# Patient Record
Sex: FEMALE | Race: AMERICAN INDIAN OR ALASKA NATIVE | NOT HISPANIC OR LATINO | Employment: OTHER | URBAN - METROPOLITAN AREA
[De-identification: names, ages, dates, MRNs, and addresses within clinical notes are randomized per-mention and may not be internally consistent; named-entity substitution may affect disease eponyms.]

---

## 2018-04-10 ENCOUNTER — NON-PROVIDER VISIT (OUTPATIENT)
Dept: HEMATOLOGY ONCOLOGY | Facility: MEDICAL CENTER | Age: 53
End: 2018-04-10

## 2018-04-10 ENCOUNTER — OFFICE VISIT (OUTPATIENT)
Dept: HEMATOLOGY ONCOLOGY | Facility: MEDICAL CENTER | Age: 53
End: 2018-04-10
Payer: OTHER GOVERNMENT

## 2018-04-10 VITALS
DIASTOLIC BLOOD PRESSURE: 80 MMHG | RESPIRATION RATE: 16 BRPM | OXYGEN SATURATION: 99 % | WEIGHT: 163.91 LBS | TEMPERATURE: 98.4 F | HEIGHT: 64 IN | BODY MASS INDEX: 27.98 KG/M2 | SYSTOLIC BLOOD PRESSURE: 120 MMHG | HEART RATE: 75 BPM

## 2018-04-10 DIAGNOSIS — Q99.9 GENETIC SYNDROME: ICD-10-CM

## 2018-04-10 PROCEDURE — 99243 OFF/OP CNSLTJ NEW/EST LOW 30: CPT | Performed by: MEDICAL GENETICS

## 2018-04-10 ASSESSMENT — PAIN SCALES - GENERAL: PAINLEVEL: NO PAIN

## 2018-04-10 NOTE — PROGRESS NOTES
"Katy Ruelas is a 53 y.o. year old patient who was referred for  genetic risk assessment     Chief Complaint:   Chief Complaint   Patient presents with   • New Patient     Ref:  Dx: possible HHT       HPI: The patient was evaluated by a local clinician where ther consideration of HHT was entertained. A family history was created and was found provocative and the physical exam of the patient with mucosal varicosities and telangectasias suggested that genetic risk assessment was in order.    Location whole body Severity mild Timing intermittant Modifying Factors none  Quality none     Duration lifelong           Context none           Associated Signs and Symptoms none    Review of Systems (ROS):  Constitutional normal   Eyes normal   ENT normal    Respiratory normal   Cardiovascular normal   Gastrointestinal normal   Genitourinary normal   Musculoskeletal normal   Skin multiple \"spots\" with spider like extensions; whole body  Neurological normal   Endocrine normal   Psychiatric normal   Hematologic/lymphatic normal   Allergic/Immunologic no sensitivities to medications reported  Remaining systems negative? Yes  Total review of symptoms  >10:       History (Past/Family/ROS)  Family History:    3 generation pedigree built  Yes   Waqas empiric risk calculation No   Brennen II empiric risk calculation  No  Social History     Yes  Patient Past History     Yes  History areas assessed    3:     Physical Exam  Constitutional    Encounter Vitals  Standard Vitals  Vitals  Blood Pressure: 120/80  Temperature: 36.9 °C (98.4 °F)  Pulse: 75  Respiration: 16  Pulse Oximetry: 99 %  Height: 162.6 cm (5' 4\")  Weight: 74.3 kg (163 lb 14.6 oz)  Encounter Vitals  Temperature: 36.9 °C (98.4 °F)  Temp Source: Temporal  Blood Pressure: 120/80  BP Location: Left, Upper Arm  Patient BP Position: Sitting  Pulse: 75  Respiration: 16  Pulse Oximetry: 99 %  Weight: 74.3 kg (163 lb 14.6 oz)  Height: 162.6 cm (5' 4\")  BMI " (Calculated): 28.14  Pain Score: No pain  Pulmonary-Specific Vitals     Durable Medical Equipment-Specific Vitals              General appearance: normal    Head Circumference: 22 inches  Eyes    Conjunctiva: clear with punctate lesions   Pupils: normal and reactive4     ENMT    External inspection: normal    Assessment of Hearing: normal    Lips/cheeks/gums: hemmorrhagic/bruising lesions in and around the oral cavity and on inside lower lip  Neck   External exam: normal    Thyroid: no masses   Respiratory   Auscultation: clear in all fields      Cardiovascular   Auscultation: RRR with no murmers   Edema : none  Chest   Breasts (exam): not done   Palpation:   GI   External exam and palpation: normal      Pelvic (female): not done   External exam (male):   Lymphatic   Palpation in 2 areas: normal     Musculoskeletal   Gait: normal    Digits and Nails: no lesions seen  Skin   Inspection: punctate hemmorrhagic lesions c/w telangectasias   Palpation: normal   Neurologic   Cranial nerves: intact   DTR’s: 1+ and equal  PE summary    18 bullets (of 25 total):     Problem Points   New, further evaluation planned (4)   Low  risk    DATA POINTS   >4:    PROBLEM POINTS  4:     RISK  Intermediate    TYPE OF MEDICAL DECISION MAKING Intermediate      40 minutes TIME (FACE TO FACE) AND DOCUMENTED  DOCUMENTATION DESCRIBE CONTENTS OF COUNSELING/CARE COORDINATION  DOCUMENTATION SUPPORT >50% TIME SPENT IN COUNSELING/COORDINATION        32474 (40 minutes)  with   Moderate complexity    Patient referred for evaluation with suspected HHT. The patient has many features of HHT and her family history is VERY suggestive of same    INVITAE 59296 HHT panel sent

## 2018-04-10 NOTE — PROGRESS NOTES
Katy Ruelas is a 53 y.o. female here for a non-provider visit for: Lab Draws  on 4/10/2018 at 11:53 AM    Procedure Performed: Venipuncture     Anatomical site: Left Antecubital Area (AC)    Equipment used: 21g    Labs drawn: [employer] [facility]     Ordering Provider: Dr. Barry Joya    Immanuel By: MANDIE CLARKE  No complications.  was present in the office the entire time that I was doing the patients’ blood draw.

## 2018-04-16 ENCOUNTER — TELEPHONE (OUTPATIENT)
Dept: HEMATOLOGY ONCOLOGY | Facility: MEDICAL CENTER | Age: 53
End: 2018-04-16

## 2018-04-16 NOTE — TELEPHONE ENCOUNTER
Spoke to Katy concerning her referral.  Per her she had gone in to see the provider at [facility] Monday April 9th, at that time [facility] was to have sent the referral.  She was going to follow up with their office.  Verified she had the correct information and fax # for the referral.

## 2018-04-19 ENCOUNTER — TELEPHONE (OUTPATIENT)
Dept: HEMATOLOGY ONCOLOGY | Facility: MEDICAL CENTER | Age: 53
End: 2018-04-19

## 2018-04-19 NOTE — TELEPHONE ENCOUNTER
Patient returned Dr. Joya's call in regards to her test results. Patient can be reached at [phone].

## 2018-04-24 NOTE — TELEPHONE ENCOUNTER
"TC to patient   Discussed \"negative\" results    Patient aware of no pathologic variants.    Toan  "

## 2018-04-25 ENCOUNTER — OFFICE VISIT (OUTPATIENT)
Dept: NEUROLOGY | Facility: MEDICAL CENTER | Age: 53
End: 2018-04-25
Payer: OTHER GOVERNMENT

## 2018-04-25 VITALS
DIASTOLIC BLOOD PRESSURE: 76 MMHG | SYSTOLIC BLOOD PRESSURE: 120 MMHG | HEIGHT: 64 IN | TEMPERATURE: 99.3 F | HEART RATE: 91 BPM | WEIGHT: 163.47 LBS | OXYGEN SATURATION: 96 % | BODY MASS INDEX: 27.91 KG/M2 | RESPIRATION RATE: 16 BRPM

## 2018-04-25 DIAGNOSIS — G56.03 BILATERAL CARPAL TUNNEL SYNDROME: Primary | ICD-10-CM

## 2018-04-25 PROCEDURE — 99205 OFFICE O/P NEW HI 60 MIN: CPT | Performed by: PSYCHIATRY & NEUROLOGY

## 2018-04-25 RX ORDER — PREDNISONE 10 MG/1
TABLET ORAL
Qty: 45 TAB | Refills: 0 | Status: SHIPPED | OUTPATIENT
Start: 2018-04-25 | End: 2019-08-06

## 2018-04-25 ASSESSMENT — PATIENT HEALTH QUESTIONNAIRE - PHQ9: CLINICAL INTERPRETATION OF PHQ2 SCORE: 0

## 2018-04-25 ASSESSMENT — ENCOUNTER SYMPTOMS
FALLS: 0
ABDOMINAL PAIN: 0
MEMORY LOSS: 0
SENSORY CHANGE: 0
TINGLING: 0
FOCAL WEAKNESS: 1

## 2018-04-25 NOTE — PROGRESS NOTES
Subjective:      Katy Ruelas is a 53 y.o. female who presents for consultation from the office of BAKARI Tabor, with a history of bilateral hand and upper extremity pain suggestive of possible CTS.    KAMRON Burns is a pleasant 53-year-old right-handed  female whose symptoms started over the last year and which have slowly and steadily progressed. She was initially aware of some pain and aching sensation in the palms, it seemed to start on the right side, her dominant side, but eventually it was bilateral. She now describes a constant aching in the palm which can then be associated with a shooting pain into the palm which then radiates up the volar surface under the wrist and to the medial forearm. She denies any paresthesias or dysesthesias. Simple use of the hands will elicit the shooting pains, the biggest issue has to do with fine use of the hands and fingers, twisting actions, etc. 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 She denies loss of sensation in the hands.    Now she even has symptoms that can awaken her from sleep, mostly the stabbing and shooting pains. Static position of the upper extremities does not elicit symptoms.    She denies any gross motor impairment such as holding heavy objects or lifting her arms above her head. She has some chronic neck pain, she denies tingling between the shoulder blades when she flexes her neck forwards, but none of this elicits pain radiating into the upper extremities or down the spinal axis, respectively. The symptoms do not change on Valsalva, she denies sensory symptoms in the lower extremities, constrictive feelings across the chest, change in bowel or bladder function.    She saw Mr. Zuniga initially, she was placed on some medicine that she takes at night, this helps the symptoms in the morning, but things simply worsen as the day goes on. She does not  "remember the name of the drug, but she remembers being told not to take Motrin at the same time.    She has a history of celiac disease without neurologic complication, is not on immunosuppressive treatments, she also denies a history of thyroid disease, diabetes, malignancy, MS, seizure, migraine, hypertension, CAD, CVA, PVD, pulmonary disease, or blood dyscrasia. There is no surgical history of note. No one in the family has a history of similar symptoms. Her mother is 85 and alive and well, her father  from complications of pneumonia, one brother has diabetes, both her children are alive and well. She does not smoke, she will occasionally have a glass of wine on a social occasion, she works in real estate. She is presently on no medications other than the single medicine she takes by  Efrain'prescription.    Review of Systems   Constitutional: Negative for malaise/fatigue.   Gastrointestinal: Negative for abdominal pain.   Musculoskeletal: Negative for falls and joint pain.   Neurological: Positive for focal weakness. Negative for tingling and sensory change.   Psychiatric/Behavioral: Negative for memory loss.   All other systems reviewed and are negative.       Objective:     /76   Pulse 91   Temp 37.4 °C (99.3 °F)   Resp 16   Ht 1.626 m (5' 4\")   Wt 74.2 kg (163 lb 7.5 oz)   SpO2 96%   BMI 28.06 kg/m²      Physical Exam    She appears in no acute distress. Her vital signs are stable. There is no malar rash, jaw or temporal tenderness, or jaw claudication. The neck is supple, range of motion is full, forced flexion of the neck does elicit some tingling between the scapula only, compression maneuvers of the cervical spine are negative. Carotid pulses are present bilaterally without asymmetry. Cardiac evaluation reveals a regular rhythm. There is some tenderness at the wrists bilaterally to direct compression, but Tinel and Phalen signs are negative for paresthesias, there is increasing pain in " the palm. There is no tenderness of the medial elbow bilaterally. Distal pulses are intact. There is no swelling of the wrists.    She is fully oriented, there is no aphasia or inattention. PERRLA/EOMI, visual fields are full, facial movements are symmetric, there is no sensory loss on either side of the face temperature light touch, the tongue and uvular midline, shoulder shrug and head rotation are intact.    Musculoskeletal exam reveals normal tone bilaterally, there is no evidence of atrophy, tremor, asterixis or drift. Strength is intact though there is some mild antalgic weakness with grasp bilaterally. There is no intrinsic muscular weakness in either of the hands. Reflexes are brisk and present at all points including the lower extremities, none dropped, there are no asymmetries, both toes are downgoing.    Repetitive movements with the hands, fingers and feet are intact and symmetric with normal amplitude and frequencies. There is no appendicular dystaxia. She stands easily, arm swing is symmetric, gait is of normal station.    Sensory exam is exquisitely intact to temperature, pinprick, light touch, vibration and JPS in all 4 extremities.     Assessment/Plan:     1. Bilateral carpal tunnel syndrome  I suspect that this is the diagnosis, I agree with Mr. Zuniga in this assessment. Bilaterality is a little unusual, though she has wrists given the job requirements that she has. There was nothing to suggest a spinal cord localization at this time, she is not myelopathic, there are no radicular signs or components to her pain. Imaging of the neck vessels is not indicated. Fortunately, her examination is normal, always a good sign, though she still has significant pain and ache with simple compression as well as by history, a constant problem. Simple physical activity seems to be making things worse. She will go home and find out the name of the medication she is presently taking, I would recommend using oral  steroids first in the hopes of getting better control overall, then using this other medicine or another anti-inflammatory if appropriate, to maintain control. Hand therapies were also indicated. Depending on benefit and tolerability as well as symptom stabilization, we may need to go to the next group which would be steroid injection at the wrist, EMG/NCV studies, etc. Depending on these results, referral to a surgical hand specialist might be necessary.    Face-to-face time was spent reviewing all the above. She was told that she can do very well, but therapies and anti-inflammatories are certainly critical portion of the treatment regimen regardless of whether or not surgery is indicated. Phone contact in the interim as we adjust medications and treatment recommendations, follow-up otherwise in several months.    Prednisone 60 mg daily will be started, reduce by 10 mg every other day until off. She was told to stop the medicine she is presently on, we will then decide what follow-up after the steroids are completed. Referral to hand specialist for custom splinting, improve range of motion, pain reduction and reinjury prevention is also important. Further recommendations will be made depending on response and tolerability.    - predniSONE (DELTASONE) 10 MG Tab; 6 tab daily for 2 days, then reduce by 1 tab every other day until off  Dispense: 45 Tab; Refill: 0  - REFERRAL TO PHYSICAL THERAPY Reason for Therapy: Eval/Treat/Report    Time: Evaluation of 60 minutes for exam, review, discussion, and education  Discussion: As mentioned in the assessment, over 50% of the time spent face-to-face counseling and coordination of care

## 2018-04-27 ENCOUNTER — TELEPHONE (OUTPATIENT)
Dept: HEMATOLOGY ONCOLOGY | Facility: MEDICAL CENTER | Age: 53
End: 2018-04-27

## 2018-04-27 NOTE — TELEPHONE ENCOUNTER
Dr Barcenas (Dermatologist)  Medical Assistant called stated that Dr Barcenas ,would like for Dr Joya to call back next week regarding the patient. Thank you

## 2018-05-01 ENCOUNTER — TELEPHONE (OUTPATIENT)
Dept: NEUROLOGY | Facility: MEDICAL CENTER | Age: 53
End: 2018-05-01

## 2018-05-01 NOTE — TELEPHONE ENCOUNTER
Patient called for Dr. Patino in regards to her Meloxicam. She states she is taking 7.5 mg PRN for pain. She stated Dr. Patino might want to adjust her dose. Please advise.

## 2019-03-05 ENCOUNTER — TELEPHONE (OUTPATIENT)
Dept: RADIOLOGY | Facility: MEDICAL CENTER | Age: 54
End: 2019-03-05

## 2019-03-06 ENCOUNTER — HOSPITAL ENCOUNTER (OUTPATIENT)
Dept: RADIOLOGY | Facility: MEDICAL CENTER | Age: 54
End: 2019-03-06
Attending: PHYSICIAN ASSISTANT
Payer: OTHER GOVERNMENT

## 2019-03-06 DIAGNOSIS — Z12.31 VISIT FOR SCREENING MAMMOGRAM: ICD-10-CM

## 2019-03-06 PROCEDURE — 77063 BREAST TOMOSYNTHESIS BI: CPT

## 2019-06-14 ENCOUNTER — HOSPITAL ENCOUNTER (OUTPATIENT)
Dept: RADIOLOGY | Facility: MEDICAL CENTER | Age: 54
End: 2019-06-14
Attending: PHYSICIAN ASSISTANT
Payer: OTHER GOVERNMENT

## 2019-06-14 DIAGNOSIS — R10.11 ABDOMINAL PAIN, RIGHT UPPER QUADRANT: ICD-10-CM

## 2019-06-14 PROCEDURE — 76705 ECHO EXAM OF ABDOMEN: CPT

## 2019-08-06 DIAGNOSIS — Z01.812 PRE-OPERATIVE LABORATORY EXAMINATION: ICD-10-CM

## 2019-08-06 LAB
ALBUMIN SERPL BCP-MCNC: 4 G/DL (ref 3.2–4.9)
ALBUMIN SERPL BCP-MCNC: 4 G/DL (ref 3.2–4.9)
ALBUMIN/GLOB SERPL: 1.6 G/DL
ALP SERPL-CCNC: 51 U/L (ref 30–99)
ALP SERPL-CCNC: 51 U/L (ref 30–99)
ALT SERPL-CCNC: 13 U/L (ref 2–50)
ALT SERPL-CCNC: 13 U/L (ref 2–50)
ANION GAP SERPL CALC-SCNC: 7 MMOL/L (ref 0–11.9)
AST SERPL-CCNC: 14 U/L (ref 12–45)
AST SERPL-CCNC: 15 U/L (ref 12–45)
BILIRUB CONJ SERPL-MCNC: 0.1 MG/DL (ref 0.1–0.5)
BILIRUB INDIRECT SERPL-MCNC: 0.7 MG/DL (ref 0–1)
BILIRUB SERPL-MCNC: 0.8 MG/DL (ref 0.1–1.5)
BILIRUB SERPL-MCNC: 0.8 MG/DL (ref 0.1–1.5)
BUN SERPL-MCNC: 13 MG/DL (ref 8–22)
CALCIUM SERPL-MCNC: 8.8 MG/DL (ref 8.5–10.5)
CHLORIDE SERPL-SCNC: 108 MMOL/L (ref 96–112)
CO2 SERPL-SCNC: 25 MMOL/L (ref 20–33)
CREAT SERPL-MCNC: 0.78 MG/DL (ref 0.5–1.4)
GLOBULIN SER CALC-MCNC: 2.5 G/DL (ref 1.9–3.5)
GLUCOSE SERPL-MCNC: 94 MG/DL (ref 65–99)
POTASSIUM SERPL-SCNC: 3.8 MMOL/L (ref 3.6–5.5)
PROT SERPL-MCNC: 6.5 G/DL (ref 6–8.2)
PROT SERPL-MCNC: 6.5 G/DL (ref 6–8.2)
SODIUM SERPL-SCNC: 140 MMOL/L (ref 135–145)

## 2019-08-06 PROCEDURE — 80076 HEPATIC FUNCTION PANEL: CPT

## 2019-08-06 PROCEDURE — 80053 COMPREHEN METABOLIC PANEL: CPT

## 2019-08-06 PROCEDURE — 36415 COLL VENOUS BLD VENIPUNCTURE: CPT

## 2019-08-06 RX ORDER — ACETAMINOPHEN 500 MG
500-1000 TABLET ORAL EVERY 6 HOURS PRN
COMMUNITY

## 2019-08-06 SDOH — HEALTH STABILITY: MENTAL HEALTH: HOW OFTEN DO YOU HAVE A DRINK CONTAINING ALCOHOL?: MONTHLY OR LESS

## 2019-08-09 ENCOUNTER — ANESTHESIA EVENT (OUTPATIENT)
Dept: SURGERY | Facility: MEDICAL CENTER | Age: 54
End: 2019-08-09
Payer: OTHER GOVERNMENT

## 2019-08-09 ENCOUNTER — ANESTHESIA (OUTPATIENT)
Dept: SURGERY | Facility: MEDICAL CENTER | Age: 54
End: 2019-08-09
Payer: OTHER GOVERNMENT

## 2019-08-09 ENCOUNTER — HOSPITAL ENCOUNTER (OUTPATIENT)
Facility: MEDICAL CENTER | Age: 54
End: 2019-08-09
Attending: SURGERY | Admitting: SURGERY
Payer: OTHER GOVERNMENT

## 2019-08-09 VITALS
WEIGHT: 154.32 LBS | HEIGHT: 64 IN | BODY MASS INDEX: 26.35 KG/M2 | HEART RATE: 70 BPM | DIASTOLIC BLOOD PRESSURE: 65 MMHG | OXYGEN SATURATION: 97 % | SYSTOLIC BLOOD PRESSURE: 118 MMHG | RESPIRATION RATE: 16 BRPM | TEMPERATURE: 98.2 F

## 2019-08-09 DIAGNOSIS — G89.18 POSTOPERATIVE PAIN: ICD-10-CM

## 2019-08-09 DIAGNOSIS — K81.1 CHRONIC CHOLECYSTITIS: ICD-10-CM

## 2019-08-09 LAB — PATHOLOGY CONSULT NOTE: NORMAL

## 2019-08-09 PROCEDURE — 160009 HCHG ANES TIME/MIN: Performed by: SURGERY

## 2019-08-09 PROCEDURE — A6402 STERILE GAUZE <= 16 SQ IN: HCPCS | Performed by: SURGERY

## 2019-08-09 PROCEDURE — 160046 HCHG PACU - 1ST 60 MINS PHASE II: Performed by: SURGERY

## 2019-08-09 PROCEDURE — 88304 TISSUE EXAM BY PATHOLOGIST: CPT

## 2019-08-09 PROCEDURE — 160035 HCHG PACU - 1ST 60 MINS PHASE I: Performed by: SURGERY

## 2019-08-09 PROCEDURE — 700111 HCHG RX REV CODE 636 W/ 250 OVERRIDE (IP): Performed by: ANESTHESIOLOGY

## 2019-08-09 PROCEDURE — 500868 HCHG NEEDLE, SURGI(VARES): Performed by: SURGERY

## 2019-08-09 PROCEDURE — 700102 HCHG RX REV CODE 250 W/ 637 OVERRIDE(OP): Performed by: ANESTHESIOLOGY

## 2019-08-09 PROCEDURE — 160025 RECOVERY II MINUTES (STATS): Performed by: SURGERY

## 2019-08-09 PROCEDURE — 160048 HCHG OR STATISTICAL LEVEL 1-5: Performed by: SURGERY

## 2019-08-09 PROCEDURE — 502571 HCHG PACK, LAP CHOLE: Performed by: SURGERY

## 2019-08-09 PROCEDURE — 160028 HCHG SURGERY MINUTES - 1ST 30 MINS LEVEL 3: Performed by: SURGERY

## 2019-08-09 PROCEDURE — 160002 HCHG RECOVERY MINUTES (STAT): Performed by: SURGERY

## 2019-08-09 PROCEDURE — A9270 NON-COVERED ITEM OR SERVICE: HCPCS | Performed by: ANESTHESIOLOGY

## 2019-08-09 PROCEDURE — 160036 HCHG PACU - EA ADDL 30 MINS PHASE I: Performed by: SURGERY

## 2019-08-09 PROCEDURE — 700101 HCHG RX REV CODE 250: Performed by: SURGERY

## 2019-08-09 PROCEDURE — 501582 HCHG TROCAR, THRD BLADED: Performed by: SURGERY

## 2019-08-09 PROCEDURE — 700105 HCHG RX REV CODE 258: Performed by: SURGERY

## 2019-08-09 PROCEDURE — 700101 HCHG RX REV CODE 250: Performed by: ANESTHESIOLOGY

## 2019-08-09 PROCEDURE — 501584 HCHG TROCAR, THRD CAN&SEAL11X100: Performed by: SURGERY

## 2019-08-09 PROCEDURE — 160039 HCHG SURGERY MINUTES - EA ADDL 1 MIN LEVEL 3: Performed by: SURGERY

## 2019-08-09 PROCEDURE — 501838 HCHG SUTURE GENERAL: Performed by: SURGERY

## 2019-08-09 RX ORDER — LIDOCAINE HYDROCHLORIDE 40 MG/ML
SOLUTION TOPICAL PRN
Status: DISCONTINUED | OUTPATIENT
Start: 2019-08-09 | End: 2019-08-09 | Stop reason: HOSPADM

## 2019-08-09 RX ORDER — SODIUM CHLORIDE, SODIUM LACTATE, POTASSIUM CHLORIDE, CALCIUM CHLORIDE 600; 310; 30; 20 MG/100ML; MG/100ML; MG/100ML; MG/100ML
INJECTION, SOLUTION INTRAVENOUS CONTINUOUS
Status: DISCONTINUED | OUTPATIENT
Start: 2019-08-09 | End: 2019-08-09 | Stop reason: HOSPADM

## 2019-08-09 RX ORDER — ONDANSETRON 2 MG/ML
INJECTION INTRAMUSCULAR; INTRAVENOUS PRN
Status: DISCONTINUED | OUTPATIENT
Start: 2019-08-09 | End: 2019-08-09 | Stop reason: HOSPADM

## 2019-08-09 RX ORDER — CEFOTETAN DISODIUM 2 G/20ML
INJECTION, POWDER, FOR SOLUTION INTRAMUSCULAR; INTRAVENOUS
Status: DISCONTINUED
Start: 2019-08-09 | End: 2019-08-09 | Stop reason: HOSPADM

## 2019-08-09 RX ORDER — HYDROMORPHONE HYDROCHLORIDE 1 MG/ML
0.1 INJECTION, SOLUTION INTRAMUSCULAR; INTRAVENOUS; SUBCUTANEOUS
Status: DISCONTINUED | OUTPATIENT
Start: 2019-08-09 | End: 2019-08-09 | Stop reason: HOSPADM

## 2019-08-09 RX ORDER — OXYCODONE HCL 5 MG/5 ML
5 SOLUTION, ORAL ORAL
Status: COMPLETED | OUTPATIENT
Start: 2019-08-09 | End: 2019-08-09

## 2019-08-09 RX ORDER — HYDROMORPHONE HYDROCHLORIDE 1 MG/ML
0.2 INJECTION, SOLUTION INTRAMUSCULAR; INTRAVENOUS; SUBCUTANEOUS
Status: DISCONTINUED | OUTPATIENT
Start: 2019-08-09 | End: 2019-08-09 | Stop reason: HOSPADM

## 2019-08-09 RX ORDER — ONDANSETRON 4 MG/1
4 TABLET, FILM COATED ORAL EVERY 6 HOURS PRN
Qty: 10 TAB | Refills: 0 | Status: SHIPPED | OUTPATIENT
Start: 2019-08-09 | End: 2019-08-16

## 2019-08-09 RX ORDER — BUPIVACAINE HYDROCHLORIDE AND EPINEPHRINE 5; 5 MG/ML; UG/ML
INJECTION, SOLUTION EPIDURAL; INTRACAUDAL; PERINEURAL
Status: DISCONTINUED
Start: 2019-08-09 | End: 2019-08-09 | Stop reason: HOSPADM

## 2019-08-09 RX ORDER — HYDROMORPHONE HYDROCHLORIDE 1 MG/ML
0.4 INJECTION, SOLUTION INTRAMUSCULAR; INTRAVENOUS; SUBCUTANEOUS
Status: DISCONTINUED | OUTPATIENT
Start: 2019-08-09 | End: 2019-08-09 | Stop reason: HOSPADM

## 2019-08-09 RX ORDER — BUPIVACAINE HYDROCHLORIDE AND EPINEPHRINE 5; 5 MG/ML; UG/ML
INJECTION, SOLUTION EPIDURAL; INTRACAUDAL; PERINEURAL
Status: DISCONTINUED | OUTPATIENT
Start: 2019-08-09 | End: 2019-08-09 | Stop reason: HOSPADM

## 2019-08-09 RX ORDER — DIPHENHYDRAMINE HYDROCHLORIDE 50 MG/ML
12.5 INJECTION INTRAMUSCULAR; INTRAVENOUS
Status: DISCONTINUED | OUTPATIENT
Start: 2019-08-09 | End: 2019-08-09 | Stop reason: HOSPADM

## 2019-08-09 RX ORDER — OXYCODONE AND ACETAMINOPHEN 7.5; 325 MG/1; MG/1
1 TABLET ORAL EVERY 6 HOURS PRN
Qty: 16 TAB | Refills: 0 | Status: SHIPPED | OUTPATIENT
Start: 2019-08-09 | End: 2019-08-16

## 2019-08-09 RX ORDER — METOCLOPRAMIDE HYDROCHLORIDE 5 MG/ML
INJECTION INTRAMUSCULAR; INTRAVENOUS PRN
Status: DISCONTINUED | OUTPATIENT
Start: 2019-08-09 | End: 2019-08-09 | Stop reason: SURG

## 2019-08-09 RX ORDER — HALOPERIDOL 5 MG/ML
1 INJECTION INTRAMUSCULAR
Status: DISCONTINUED | OUTPATIENT
Start: 2019-08-09 | End: 2019-08-09 | Stop reason: HOSPADM

## 2019-08-09 RX ORDER — CEFOTETAN DISODIUM 2 G/20ML
INJECTION, POWDER, FOR SOLUTION INTRAMUSCULAR; INTRAVENOUS PRN
Status: DISCONTINUED | OUTPATIENT
Start: 2019-08-09 | End: 2019-08-09 | Stop reason: SURG

## 2019-08-09 RX ORDER — OXYCODONE HCL 5 MG/5 ML
10 SOLUTION, ORAL ORAL
Status: COMPLETED | OUTPATIENT
Start: 2019-08-09 | End: 2019-08-09

## 2019-08-09 RX ORDER — MEPERIDINE HYDROCHLORIDE 25 MG/ML
12.5 INJECTION INTRAMUSCULAR; INTRAVENOUS; SUBCUTANEOUS
Status: DISCONTINUED | OUTPATIENT
Start: 2019-08-09 | End: 2019-08-09 | Stop reason: HOSPADM

## 2019-08-09 RX ORDER — ONDANSETRON 2 MG/ML
4 INJECTION INTRAMUSCULAR; INTRAVENOUS
Status: DISCONTINUED | OUTPATIENT
Start: 2019-08-09 | End: 2019-08-09 | Stop reason: HOSPADM

## 2019-08-09 RX ADMIN — SODIUM CHLORIDE, POTASSIUM CHLORIDE, SODIUM LACTATE AND CALCIUM CHLORIDE: 600; 310; 30; 20 INJECTION, SOLUTION INTRAVENOUS at 12:21

## 2019-08-09 RX ADMIN — FENTANYL CITRATE 50 MCG: 50 INJECTION, SOLUTION INTRAMUSCULAR; INTRAVENOUS at 12:29

## 2019-08-09 RX ADMIN — ONDANSETRON 4 MG: 2 INJECTION INTRAMUSCULAR; INTRAVENOUS at 13:04

## 2019-08-09 RX ADMIN — FENTANYL CITRATE 50 MCG: 50 INJECTION, SOLUTION INTRAMUSCULAR; INTRAVENOUS at 13:34

## 2019-08-09 RX ADMIN — LIDOCAINE HYDROCHLORIDE 160 MG: 40 SOLUTION TOPICAL at 12:31

## 2019-08-09 RX ADMIN — CEFOTETAN DISODIUM 2 G: 2 INJECTION, POWDER, FOR SOLUTION INTRAMUSCULAR; INTRAVENOUS at 12:21

## 2019-08-09 RX ADMIN — SODIUM CHLORIDE, POTASSIUM CHLORIDE, SODIUM LACTATE AND CALCIUM CHLORIDE: 600; 310; 30; 20 INJECTION, SOLUTION INTRAVENOUS at 11:30

## 2019-08-09 RX ADMIN — MIDAZOLAM HYDROCHLORIDE 2 MG: 1 INJECTION, SOLUTION INTRAMUSCULAR; INTRAVENOUS at 12:24

## 2019-08-09 RX ADMIN — ROCURONIUM BROMIDE 25 MG: 10 INJECTION, SOLUTION INTRAVENOUS at 12:31

## 2019-08-09 RX ADMIN — FENTANYL CITRATE 50 MCG: 50 INJECTION, SOLUTION INTRAMUSCULAR; INTRAVENOUS at 12:22

## 2019-08-09 RX ADMIN — FENTANYL CITRATE 50 MCG: 50 INJECTION, SOLUTION INTRAMUSCULAR; INTRAVENOUS at 13:56

## 2019-08-09 RX ADMIN — PROPOFOL 150 MG: 10 INJECTION, EMULSION INTRAVENOUS at 12:31

## 2019-08-09 RX ADMIN — FENTANYL CITRATE 25 MCG: 50 INJECTION, SOLUTION INTRAMUSCULAR; INTRAVENOUS at 12:35

## 2019-08-09 RX ADMIN — OXYCODONE HYDROCHLORIDE 10 MG: 5 SOLUTION ORAL at 13:36

## 2019-08-09 RX ADMIN — METOCLOPRAMIDE 10 MG: 5 INJECTION, SOLUTION INTRAMUSCULAR; INTRAVENOUS at 12:30

## 2019-08-09 ASSESSMENT — PAIN SCALES - GENERAL: PAIN_LEVEL: 3

## 2019-08-09 NOTE — ANESTHESIA PROCEDURE NOTES
Airway  Date/Time: 8/9/2019 12:31 PM  Performed by: Phillip Rainey M.D.  Authorized by: Phillip Rainey M.D.     Location:  OR  Urgency:  Elective  Indications for Airway Management:  Anesthesia  Spontaneous Ventilation: absent    Sedation Level:  Deep  Preoxygenated: Yes    Patient Position:  Sniffing  Final Airway Type:  Endotracheal airway  Final Endotracheal Airway:  ETT  Cuffed: Yes    Technique Used for Successful ETT Placement:  Direct laryngoscopy  Insertion Site:  Oral  Blade Type:  Fly  Laryngoscope Blade/Videolaryngoscope Blade Size:  3  ETT Size (mm):  7.0  Measured from:  Teeth  ETT to Teeth (cm):  21  Placement Verified by: auscultation and capnometry    Cormack-Lehane Classification:  Grade I - full view of glottis  Number of Attempts at Approach:  1

## 2019-08-09 NOTE — OR NURSING
1521 Report from Francesca KHALIL.    1534 Pt up to side of bed to change clothes, tolerated well. Moved to recliner.    1545 Pt placed on ear probe to watch O2.  Ice pack provided.    1550 Discharge instructions reviewed with patient and .   1600 Pt meets d/c criteria, feels ready to go home.  PIV removed, pt tolerated well.   1609 Pt escorted out via wheelchair to d/c home with .

## 2019-08-09 NOTE — OR NURSING
1335- assumed care of pt she is c/o high level pain medicated per orders with both iv and po pain meds see mar  1400- remedicated iv fent cold pack to surg sites gauze and tegaderm cdi without bleeding   Tolerating po fluids   1521 doesn't feel need to void yet pain tolerable level without nausea surg inc cdi spouse at bedside report to Dyan wilkerson for break.

## 2019-08-09 NOTE — OR SURGEON
Immediate Post OP Note    PreOp Diagnosis: Biliary Colic    PostOp Diagnosis: Same    Procedure(s):  CHOLECYSTECTOMY, LAPAROSCOPIC - Wound Class: Clean Contaminated    Surgeon(s):  Tj Richardson M.D.    Anesthesiologist/Type of Anesthesia:GET  Anesthesiologist: Phillip Rainey M.D./General    Surgical Staff:  Assistant: BRENT Arredondo  Circulator: Niraj Gilliland R.N.  Scrub Person: Bill Jones; Rajesh Flores; Hal Isbell    Specimens removed if any:  ID Type Source Tests Collected by Time Destination   A : GALLBLADDER Tissue Gallbladder PATHOLOGY SPECIMEN Tj Richardson M.D. 8/9/2019 12:44 PM        Estimated Blood Loss: <5 cc    Findings: no acute inflammation, adhesion to gallbladder from omentum    Complications: none        8/9/2019 1:10 PM Tj Richardson M.D.

## 2019-08-09 NOTE — DISCHARGE INSTRUCTIONS
ACTIVITY: Rest and take it easy for the first 24 hours.  A responsible adult is recommended to remain with you during that time.  It is normal to feel sleepy.  We encourage you to not do anything that requires balance, judgment or coordination.    MILD FLU-LIKE SYMPTOMS ARE NORMAL. YOU MAY EXPERIENCE GENERALIZED MUSCLE ACHES, THROAT IRRITATION, HEADACHE AND/OR SOME NAUSEA.    FOR 24 HOURS DO NOT:  Drive, operate machinery or run household appliances.  Drink beer or alcoholic beverages.   Make important decisions or sign legal documents.    SPECIAL INSTRUCTIONS: *Follow up: 10-14 days  Activity:no lifting weight greater than 20 lbs x 4 weeks  Diet:clear liquids tonight and advance as tolerated  Showers only x 2 weeks  No driving for one week or while on pain medications  Wound Care:remove tegaderms in 4 days**    DIET: To avoid nausea, slowly advance diet as tolerated, avoiding spicy or greasy foods for the first day.  Add more substantial food to your diet according to your physician's instructions.  Babies can be fed formula or breast milk as soon as they are hungry.  INCREASE FLUIDS AND FIBER TO AVOID CONSTIPATION.    SURGICAL DRESSING/BATHING: *see above**    FOLLOW-UP APPOINTMENT:  A follow-up appointment should be arranged with your doctor in; call to schedule Or follow up as already scheduled     You should CALL YOUR PHYSICIAN if you develop:  Fever greater than 101 degrees F.  Pain not relieved by medication, or persistent nausea or vomiting.  Excessive bleeding (blood soaking through dressing) or unexpected drainage from the wound.  Extreme redness or swelling around the incision site, drainage of pus or foul smelling drainage.  Inability to urinate or empty your bladder within 8 hours.  Problems with breathing or chest pain.    You should call 911 if you develop problems with breathing or chest pain.  If you are unable to contact your doctor or surgical center, you should go to the nearest emergency room  or urgent care center.  Physician's telephone #: *[phone]**    If any questions arise, call your doctor.  If your doctor is not available, please feel free to call the Surgical Center at [phone].  The Center is open Monday through Friday from 7AM to 7PM.  You can also call the HEALTH HOTLINE open 24 hours/day, 7 days/week and speak to a nurse at [phone], or toll free at [phone].    A registered nurse may call you a few days after your surgery to see how you are doing after your procedure.    MEDICATIONS: Resume taking daily medication.  Take prescribed pain medication with food.  If no medication is prescribed, you may take non-aspirin pain medication if needed.  PAIN MEDICATION CAN BE VERY CONSTIPATING.  Take a stool softener or laxative such as senokot, pericolace, or milk of magnesia if needed.    Prescription given for *percocet**.  Last pain medication given at *1:30pm **.    If your physician has prescribed pain medication that includes Acetaminophen (Tylenol), do not take additional Acetaminophen (Tylenol) while taking the prescribed medication.    Depression / Suicide Risk    As you are discharged from this [facility] facility, it is important to learn how to keep safe from harming yourself.    Recognize the warning signs:  · Abrupt changes in personality, positive or negative- including increase in energy   · Giving away possessions  · Change in eating patterns- significant weight changes-  positive or negative  · Change in sleeping patterns- unable to sleep or sleeping all the time   · Unwillingness or inability to communicate  · Depression  · Unusual sadness, discouragement and loneliness  · Talk of wanting to die  · Neglect of personal appearance   · Rebelliousness- reckless behavior  · Withdrawal from people/activities they love  · Confusion- inability to concentrate     If you or a loved one observes any of these behaviors or has concerns about self-harm, here's what you can  do:  · Talk about it- your feelings and reasons for harming yourself  · Remove any means that you might use to hurt yourself (examples: pills, rope, extension cords, firearm)  · Get professional help from the community (Mental Health, Substance Abuse, psychological counseling)  · Do not be alone:Call your Safe Contact- someone whom you trust who will be there for you.  · Call your local CRISIS HOTLINE 872-8530 or 063-607-1519  · Call your local Children's Mobile Crisis Response Team [place] (408) 255-1342 or www.Yieldr  · Call the toll free National Suicide Prevention Hotlines   · National Suicide Prevention Lifeline 632-307-VSCJ (8932)  · National Hope Line Network 800-SUICIDE (787-3801)

## 2019-08-09 NOTE — ANESTHESIA POSTPROCEDURE EVALUATION
Patient: Katy Ruelas    Procedure Summary     Date:  08/09/19 Room / Location:  [facility] ROOM 21 / SURGERY SAME DAY [facility]    Anesthesia Start:  1221 Anesthesia Stop:  1314    Procedure:  CHOLECYSTECTOMY, LAPAROSCOPIC (Abdomen) Diagnosis:  (CHOLELITHIASIS)    Surgeon:  Tj Richardson M.D. Responsible Provider:  Phillip Rainey M.D.    Anesthesia Type:  general ASA Status:  2          Final Anesthesia Type: general  Last vitals  BP   Blood Pressure: 138/75    Temp   37.2 °C (99 °F)    Pulse   Pulse: 65, Heart Rate (Monitored): 65   Resp   19    SpO2   99 %      Anesthesia Post Evaluation    Patient location during evaluation: PACU  Patient participation: complete - patient participated  Level of consciousness: awake and alert  Pain score: 3    Airway patency: patent  Anesthetic complications: no  Cardiovascular status: hemodynamically stable  Respiratory status: acceptable  Hydration status: euvolemic    PONV: none

## 2019-08-09 NOTE — ANESTHESIA TIME REPORT
Anesthesia Start and Stop Event Times     Date Time Event    8/9/2019 1213 Ready for Procedure     1221 Anesthesia Start     1314 Anesthesia Stop        Responsible Staff  08/09/19    Name Role Begin End    Phillip Rainey M.D. Anesth 1221 1314        Preop Diagnosis (Free Text):  Pre-op Diagnosis     CHOLELITHIASIS        Preop Diagnosis (Codes):    Post op Diagnosis  Cholelithiases      Premium Reason  Non-Premium    Comments:

## 2019-08-09 NOTE — OR NURSING
1313 Patient arrived from OR on [facility]. Report received from anesthesia and RN. Oral airway in place. Will continue to monitor.   1323 Pt woke up, airway out.   1330 Hand-off to REMI Ma.

## 2019-08-10 NOTE — OP REPORT
DATE OF SERVICE:  08/09/2019    PREOPERATIVE DIAGNOSES:  Biliary colic, cholelithiasis.    POSTOPERATIVE DIAGNOSES:  Biliary colic, cholelithiasis.    PROCEDURE:  Laparoscopic cholecystectomy.    ANESTHESIA:  General endotracheal.    ANESTHESIOLOGIST:  Phillip Rainey MD    SURGEON:  Tj Richardson MD    FIRST ASSISTANT:  VONNIE Arredondo    INDICATIONS:  Patient with symptomatic gallbladder needs operative management.    OPERATIVE FINDINGS:  Adhesions to the gallbladder with omentum, but no acute   inflammation or ductal dilatation.    OPERATIVE NOTE:  As follows, the patient was taken to the operating room,   placed in supine position, given general endotracheal anesthesia.  Once   properly anesthetized, she was prepped and draped in the usual sterile   fashion; 0.5% with epinephrine was used to anesthetize the skin.    Infraumbilically, a transverse incision was made and carried down through the   skin and subcutaneous tissue.  A towel clamp was used to grab the umbilical   stalk and tented upward.  A Veress needle was inserted without resistance.    Aspiration and flushing revealed no abnormality.  Pneumoperitoneum was   obtained.  An 11 mm trocar port was then placed.  General exploration was   carried out.  There was no apparent injury from Veress needle or trocar   placement.  Under direct vision, the 11 mm epigastric and 2 lateral 5 mm ports   were placed.  Gallbladder was grasped at its fundus and tented up over the   liver.  The omentum that was adherent to the gallbladder was stripped down   bluntly.  The infundibulum was then grabbed and tented outward creating a   functional angle.  Peritoneum was stripped down exposing the cystic duct and   artery.  These were clearly identified and clipped proximally and distally   with proximal x3 on the duct and x2 on the artery.  Gallbladder was brought   off the liver bed with hook electrocautery and removed with an EndoCatch bag.    There was spillage of  some bile, but no stones.  Once the gallbladder had   been removed from the epigastric port site, the abdomen was irrigated out with   2 liters of saline until clear.  The liver bed was inspected.  There was no   bleeding or bile leaks from the liver bed, duct, or artery.  The epigastric   port site was closed with an 0 Vicryl Endo-close.  The umbilical port site was   closed with a figure-of-eight 0 Vicryl suture.  Skin incisions were all   closed with 4-0 Vicryl subcuticular closures.  Patient tolerated the procedure   well.  There were no apparent complications.  Lap, sponge and instrument   counts were correct.       ____________________________________     MD CHEYENNE Harris / CHARI    DD:  08/09/2019 17:29:33  DT:  08/09/2019 17:48:46    D#:  [id number]  Job#:  [id number]

## 2024-03-14 ENCOUNTER — APPOINTMENT (OUTPATIENT)
Dept: RADIOLOGY | Facility: MEDICAL CENTER | Age: 59
End: 2024-03-14
Attending: PHYSICIAN ASSISTANT
Payer: OTHER GOVERNMENT

## 2024-03-14 DIAGNOSIS — Z12.31 ENCOUNTER FOR MAMMOGRAM TO ESTABLISH BASELINE MAMMOGRAM: ICD-10-CM

## 2024-03-14 PROCEDURE — 77067 SCR MAMMO BI INCL CAD: CPT

## 2024-06-13 ENCOUNTER — APPOINTMENT (RX ONLY)
Dept: URBAN - METROPOLITAN AREA CLINIC 4 | Facility: CLINIC | Age: 59
Setting detail: DERMATOLOGY
End: 2024-06-13

## 2024-06-13 DIAGNOSIS — Z71.89 OTHER SPECIFIED COUNSELING: ICD-10-CM

## 2024-06-13 DIAGNOSIS — L91.8 OTHER HYPERTROPHIC DISORDERS OF THE SKIN: ICD-10-CM

## 2024-06-13 DIAGNOSIS — D18.0 HEMANGIOMA: ICD-10-CM

## 2024-06-13 DIAGNOSIS — L82.1 OTHER SEBORRHEIC KERATOSIS: ICD-10-CM

## 2024-06-13 PROBLEM — D18.01 HEMANGIOMA OF SKIN AND SUBCUTANEOUS TISSUE: Status: ACTIVE | Noted: 2024-06-13

## 2024-06-13 PROCEDURE — 99203 OFFICE O/P NEW LOW 30 MIN: CPT

## 2024-06-13 PROCEDURE — ? COUNSELING

## 2024-06-13 PROCEDURE — ? ADDITIONAL NOTES

## 2024-06-13 PROCEDURE — ? SUNSCREEN RECOMMENDATIONS

## 2024-06-13 ASSESSMENT — LOCATION SIMPLE DESCRIPTION DERM
LOCATION SIMPLE: LEFT UPPER BACK
LOCATION SIMPLE: RIGHT ANTERIOR NECK
LOCATION SIMPLE: RIGHT UPPER BACK

## 2024-06-13 ASSESSMENT — LOCATION DETAILED DESCRIPTION DERM
LOCATION DETAILED: RIGHT SUPERIOR UPPER BACK
LOCATION DETAILED: RIGHT INFERIOR LATERAL NECK
LOCATION DETAILED: LEFT MEDIAL UPPER BACK

## 2024-06-13 ASSESSMENT — LOCATION ZONE DERM
LOCATION ZONE: NECK
LOCATION ZONE: TRUNK

## (undated) DEVICE — SUTURE GENERAL

## (undated) DEVICE — CANNULA W/SEAL11X100ZTHREAD - (12/BX)

## (undated) DEVICE — GLOVE BIOGEL SZ 8 SURGICAL PF LTX - (50PR/BX 4BX/CA)

## (undated) DEVICE — ELECTRODE DUAL RETURN W/ CORD - (50/PK)

## (undated) DEVICE — NEEDLE INSFL 120MM 14GA VRRS - (20/BX)

## (undated) DEVICE — LACTATED RINGERS INJ 1000 ML - (14EA/CA 60CA/PF)

## (undated) DEVICE — GOWN WARMING STANDARD FLEX - (30/CA)

## (undated) DEVICE — KIT  I.V. START (100EA/CA)

## (undated) DEVICE — PACK LAP CHOLE OR - (2EA/CA)

## (undated) DEVICE — SENSOR SPO2 NEO LNCS ADHESIVE (20/BX) SEE USER NOTES

## (undated) DEVICE — KIT ANESTHESIA W/CIRCUIT & 3/LT BAG W/FILTER (20EA/CA)

## (undated) DEVICE — TROCAR Z THREAD 11 X 100 - BLADED (6/BX)

## (undated) DEVICE — SPONGE GAUZESTER. 2X2 4-PL - (2/PK 50PK/BX 30BX/CS)

## (undated) DEVICE — SET LEADWIRE 5 LEAD BEDSIDE DISPOSABLE ECG (1SET OF 5/EA)

## (undated) DEVICE — SET SUCTION/IRRIGATION WITH DISPOSABLE TIP (6/CA )PART #0250-070-520 IS A SUB

## (undated) DEVICE — CHLORAPREP 26 ML APPLICATOR - ORANGE TINT(25/CA)

## (undated) DEVICE — SUTURE 0 COATED VICRYL 6-18IN - (12PK/BX)

## (undated) DEVICE — DRESSING TRANSPARENT FILM TEGADERM 2.375 X 2.75"  (100EA/BX)"

## (undated) DEVICE — SODIUM CHL IRRIGATION 0.9% 1000ML (12EA/CA)

## (undated) DEVICE — TUBE NG SALEM SUMP 16FR (50EA/CA)

## (undated) DEVICE — WATER IRRIGATION STERILE 1000ML (12EA/CA)

## (undated) DEVICE — SUTURE 0 VICRYL PLUS UR-6 - 27 INCH (36/BX)

## (undated) DEVICE — SLEEVE, VASO, THIGH, MED

## (undated) DEVICE — HEAD HOLDER JUNIOR/ADULT

## (undated) DEVICE — DRAPESURG STERI-DRAPE LONG - (10/BX 4BX/CA)

## (undated) DEVICE — NEPTUNE 4 PORT MANIFOLD - (20/PK)

## (undated) DEVICE — CANNULA W/SEAL 5X100 Z-THRE - ADED KII (12/BX)

## (undated) DEVICE — TUBING CLEARLINK DUO-VENT - C-FLO (48EA/CA)

## (undated) DEVICE — TROCAR 5X100 BLADED Z-THREAD - KII (6/BX)

## (undated) DEVICE — PROTECTOR ULNA NERVE - (36PR/CA)

## (undated) DEVICE — SUCTION INSTRUMENT YANKAUER BULBOUS TIP W/O VENT (50EA/CA)

## (undated) DEVICE — SUTURE 4-0 VICRYL PLUS FS-2 - 27 INCH (36/BX)

## (undated) DEVICE — CLIP MED LG INTNL HRZN TI ESCP - (20/BX)

## (undated) DEVICE — GLOVE BIOGEL INDICATOR SZ 8 SURGICAL PF LTX - (50/BX 4BX/CA)

## (undated) DEVICE — STOCKING THIGH HIGH LARGE - SHORT (6PR/BX)

## (undated) DEVICE — CATHETER IV 20 GA X 1-1/4 ---SURG.& SDS ONLY--- (50EA/BX)

## (undated) DEVICE — TUBING SETDISPOS HIGH FLOW II - (10/BX)

## (undated) DEVICE — CANISTER SUCTION RIGID RED 1500CC (40EA/CA)

## (undated) DEVICE — TUBE CONNECTING SUCTION - CLEAR PLASTIC STERILE 72 IN (50EA/CA)

## (undated) DEVICE — CANISTER SUCTION 3000ML MECHANICAL FILTER AUTO SHUTOFF MEDI-VAC NONSTERILE LF DISP  (40EA/CA)

## (undated) DEVICE — MASK ANESTHESIA ADULT  - (100/CA)